# Patient Record
Sex: FEMALE | Race: WHITE | Employment: PART TIME | URBAN - NONMETROPOLITAN AREA
[De-identification: names, ages, dates, MRNs, and addresses within clinical notes are randomized per-mention and may not be internally consistent; named-entity substitution may affect disease eponyms.]

---

## 2018-02-26 ENCOUNTER — HOSPITAL ENCOUNTER (OUTPATIENT)
Age: 43
Discharge: HOME | End: 2018-02-26
Payer: COMMERCIAL

## 2018-02-26 ENCOUNTER — HOSPITAL ENCOUNTER (OUTPATIENT)
Dept: GENERAL RADIOLOGY | Age: 43
Discharge: HOME | End: 2018-02-26
Payer: COMMERCIAL

## 2018-02-26 DIAGNOSIS — R52 PAIN: ICD-10-CM

## 2018-02-26 PROCEDURE — 72100 X-RAY EXAM L-S SPINE 2/3 VWS: CPT

## 2018-07-19 ENCOUNTER — HOSPITAL ENCOUNTER (OUTPATIENT)
Dept: GENERAL RADIOLOGY | Age: 43
Discharge: HOME | End: 2018-07-19
Payer: COMMERCIAL

## 2018-07-19 ENCOUNTER — HOSPITAL ENCOUNTER (OUTPATIENT)
Age: 43
Discharge: HOME | End: 2018-07-19
Payer: COMMERCIAL

## 2018-07-19 DIAGNOSIS — S99.921A INJURY OF FOOT, RIGHT, INITIAL ENCOUNTER: ICD-10-CM

## 2018-07-19 PROCEDURE — 73630 X-RAY EXAM OF FOOT: CPT

## 2020-02-08 ENCOUNTER — HOSPITAL ENCOUNTER (OUTPATIENT)
Age: 45
Discharge: HOME | End: 2020-02-08
Payer: COMMERCIAL

## 2020-02-08 LAB
C-REACTIVE PROTEIN: 0.16 MG/DL (ref 0–1)
CHOLESTEROL, FASTING: 168 MG/DL (ref 100–199)
GLUCOSE FASTING: 98 MG/DL (ref 70–108)
HDLC SERPL-MCNC: 36 MG/DL
LDL CHOLESTEROL CALCULATED: 118 MG/DL
RHEUMATOID FACTOR: < 10 IU/ML (ref 0–13)
SEDIMENTATION RATE, ERYTHROCYTE: 3 MM/HR (ref 0–20)
TRIGLYCERIDE, FASTING: 72 MG/DL (ref 0–199)

## 2020-02-08 PROCEDURE — 36415 COLL VENOUS BLD VENIPUNCTURE: CPT

## 2020-02-08 PROCEDURE — 86140 C-REACTIVE PROTEIN: CPT

## 2020-02-08 PROCEDURE — 80061 LIPID PANEL: CPT

## 2020-02-08 PROCEDURE — 86038 ANTINUCLEAR ANTIBODIES: CPT

## 2020-02-08 PROCEDURE — 85651 RBC SED RATE NONAUTOMATED: CPT

## 2020-02-08 PROCEDURE — 82947 ASSAY GLUCOSE BLOOD QUANT: CPT

## 2020-02-08 PROCEDURE — 86430 RHEUMATOID FACTOR TEST QUAL: CPT

## 2020-02-12 LAB — ANA SCREEN: NORMAL

## 2021-06-03 PROBLEM — R31.29 MICROSCOPIC HEMATURIA: Status: ACTIVE | Noted: 2021-06-03

## 2021-06-03 PROBLEM — R31.29 MICROSCOPIC HEMATURIA: Status: RESOLVED | Noted: 2021-06-03 | Resolved: 2021-06-03

## 2021-06-03 PROBLEM — J45.40 MODERATE PERSISTENT ASTHMA WITHOUT COMPLICATION: Status: ACTIVE | Noted: 2021-06-03

## 2021-06-03 PROBLEM — J45.40 MODERATE PERSISTENT ASTHMA WITHOUT COMPLICATION: Status: RESOLVED | Noted: 2021-06-03 | Resolved: 2021-06-03

## 2022-06-20 PROBLEM — J45.20 MILD INTERMITTENT ASTHMA WITHOUT COMPLICATION: Status: ACTIVE | Noted: 2022-06-20

## 2022-06-20 PROBLEM — F51.01 PRIMARY INSOMNIA: Status: ACTIVE | Noted: 2022-06-20

## 2022-09-07 ENCOUNTER — APPOINTMENT (OUTPATIENT)
Dept: GENERAL RADIOLOGY | Age: 47
End: 2022-09-07
Payer: COMMERCIAL

## 2022-09-07 ENCOUNTER — HOSPITAL ENCOUNTER (EMERGENCY)
Age: 47
Discharge: HOME | End: 2022-09-07
Attending: EMERGENCY MEDICINE
Payer: COMMERCIAL

## 2022-09-07 VITALS
TEMPERATURE: 97.7 F | RESPIRATION RATE: 14 BRPM | DIASTOLIC BLOOD PRESSURE: 76 MMHG | WEIGHT: 175 LBS | SYSTOLIC BLOOD PRESSURE: 141 MMHG | BODY MASS INDEX: 33.04 KG/M2 | HEIGHT: 61 IN | HEART RATE: 91 BPM | OXYGEN SATURATION: 99 %

## 2022-09-07 DIAGNOSIS — M79.672 ACUTE FOOT PAIN, LEFT: Primary | ICD-10-CM

## 2022-09-07 PROCEDURE — 99283 EMERGENCY DEPT VISIT LOW MDM: CPT

## 2022-09-07 PROCEDURE — 73630 X-RAY EXAM OF FOOT: CPT

## 2022-09-07 ASSESSMENT — PAIN - FUNCTIONAL ASSESSMENT
PAIN_FUNCTIONAL_ASSESSMENT: 0-10
PAIN_FUNCTIONAL_ASSESSMENT: 0-10

## 2022-09-07 ASSESSMENT — PAIN DESCRIPTION - ORIENTATION
ORIENTATION: LEFT
ORIENTATION: LEFT

## 2022-09-07 ASSESSMENT — PAIN DESCRIPTION - LOCATION
LOCATION: FOOT
LOCATION: FOOT

## 2022-09-07 ASSESSMENT — PAIN SCALES - GENERAL
PAINLEVEL_OUTOF10: 4
PAINLEVEL_OUTOF10: 8

## 2022-09-07 NOTE — ED NOTES
Pt presents w/ left lateral foot pain. States that she caught her little toe on a shopping cart a couple of months ago. It did swell and bruise at the time. It has not been x-rayed. She is now having continued pain and swelling proximal to the toe.      Benjamin Camejo RN  09/07/22 0517

## 2022-09-07 NOTE — DISCHARGE INSTRUCTIONS
Wear the orthopedic shoe as needed for comfort and protection. Rest, elevate, ice the area as needed. Ibuprofen 600 mg every 6 hours as needed for pain and inflammation.

## 2022-09-07 NOTE — Clinical Note
Leonardo Dc was seen and treated in our emergency department on 9/7/2022. She may return to work on 09/08/2022. If you have any questions or concerns, please don't hesitate to call.       Daryl Limon MD

## 2022-09-07 NOTE — ED PROVIDER NOTES
[address] [facility]  eMERGENCY dEPARTMENT eNCOUnter             Jeaneth Todd 19 COMPLAINT    Chief Complaint   Patient presents with    Foot Pain       Nurses Notes reviewed and I agree except as noted in the HPI. HPI    Nelly Garcia is a 42 y.o. female who presents stating that ago she \"caught her left little toe \"on a shopping cart and had a lot of pain and swelling in the base of the toe for several days, but it gradually got better. However, in the last few days she has had increasing pain at the base of the toe and it seems more swollen. She cannot remember any new injury to it. Current pain level is 4/10, aching, constant. The pain is worse when she wears her shoes at work, feels some better if she wears a flip-flop. Over-the-counter medicine helps a little. REVIEW OF SYSTEMS      Review of Systems   Constitutional: Negative. Musculoskeletal:  Negative for falls. Neurological:  Negative for sensory change. All other systems reviewed and are negative. PAST MEDICAL HISTORY     has a past medical history of Asthma and Microscopic hematuria. SURGICAL HISTORY     has a past surgical history that includes Tonsillectomy; Cholecystectomy, laparoscopic; and Knee arthroscopy w/ meniscectomy (Right, 2013). CURRENT MEDICATIONS    Discharge Medication List as of 9/7/2022 12:58 PM        CONTINUE these medications which have NOT CHANGED    Details   albuterol sulfate HFA (PROVENTIL;VENTOLIN;PROAIR) 108 (90 Base) MCG/ACT inhaler INHALE 2 PUFFS BY MOUTH EVERY 6 HOURS AS NEEDED, Disp-9 g, R-7Normal      fluticasone-salmeterol (ADVAIR HFA) 115-21 MCG/ACT inhaler Inhale 2 puffs by mouth twice daily, Disp-12 g, R-11Normal      traZODone (DESYREL) 150 MG tablet Take 1 tablet by mouth nightly, Disp-90 tablet, R-3Normal             ALLERGIES    is allergic to keflex [cephalexin], levaquin [levofloxacin], penicillins, and tetracyclines & related.     FAMILY HISTORY She indicated that the status of her maternal grandmother is unknown.   family history includes Diabetes in her maternal grandmother. SOCIAL HISTORY     reports that she has quit smoking. Her smoking use included cigarettes. She started smoking about 27 years ago. She smoked an average of .5 packs per day. She has never used smokeless tobacco. She reports that she does not currently use alcohol. She reports that she does not use drugs. PHYSICAL EXAM       INITIAL VITALS: BP (!) 141/76   Pulse 91   Temp 97.7 °F (36.5 °C) (Temporal)   Resp 14   Ht 5' 1\" (1.549 m)   Wt 175 lb (79.4 kg)   SpO2 99%   BMI 33.07 kg/m²      Physical Exam  Vitals and nursing note reviewed. Constitutional:       General: She is not in acute distress. HENT:      Head: Atraumatic. Eyes:      Pupils: Pupils are equal, round, and reactive to light. Cardiovascular:      Pulses: Normal pulses. Musculoskeletal:         General: Swelling and tenderness (Foot, head of the fifth metatarsal at the metatarsophalangeal joint, swollen, tender, no obvious deformity.) present. Skin:     General: Skin is warm and dry. Capillary Refill: Capillary refill takes less than 2 seconds. Neurological:      General: No focal deficit present. Mental Status: She is alert and oriented to person, place, and time. Psychiatric:         Behavior: Behavior normal.            RADIOLOGY:    XR FOOT LEFT (MIN 3 VIEWS)   Final Result   1. No acute bony abnormality. **This report has been created using voice recognition software. It may contain minor errors which are inherent in voice recognition technology. **      Final report electronically signed by Dr Selam Perrin on 9/7/2022 12:22 PM               Vitals:    Vitals:    09/07/22 1143   BP: (!) 141/76   Pulse: 91   Resp: 14   Temp: 97.7 °F (36.5 °C)   TempSrc: Temporal   SpO2: 99%   Weight: 175 lb (79.4 kg)   Height: 5' 1\" (1.549 m)       EMERGENCY DEPARTMENT COURSE:      X-ray results and plan of care discussed with the patient. She was placed in a postop shoe. General measures discussed. FINAL IMPRESSION      1.  Acute foot pain, left        DISPOSITION/PLAN    DISPOSITION Decision To Discharge 09/07/2022 12:33:54 PM      PATIENT REFERRED TO:    [address] [place] [address]  [phone]  Call   As needed for persistent pain    DISCHARGE MEDICATIONS:    Discharge Medication List as of 9/7/2022 12:58 PM             (Please note that portions of this note were completed with a voice recognition program.  Efforts were made to edit the dictations but occasionally words are mis-transcribed.)       Emanuel Curiel MD  09/07/22 2039

## 2023-10-04 ENCOUNTER — APPOINTMENT (OUTPATIENT)
Dept: GENERAL RADIOLOGY | Age: 48
End: 2023-10-04
Payer: OTHER MISCELLANEOUS

## 2023-10-04 ENCOUNTER — HOSPITAL ENCOUNTER (EMERGENCY)
Age: 48
Discharge: HOME | End: 2023-10-04
Attending: EMERGENCY MEDICINE
Payer: OTHER MISCELLANEOUS

## 2023-10-04 VITALS
RESPIRATION RATE: 16 BRPM | WEIGHT: 190 LBS | SYSTOLIC BLOOD PRESSURE: 142 MMHG | HEIGHT: 62 IN | OXYGEN SATURATION: 96 % | TEMPERATURE: 98.3 F | BODY MASS INDEX: 34.96 KG/M2 | HEART RATE: 98 BPM | DIASTOLIC BLOOD PRESSURE: 77 MMHG

## 2023-10-04 DIAGNOSIS — S63.501A SPRAIN OF RIGHT FOREARM, INITIAL ENCOUNTER: Primary | ICD-10-CM

## 2023-10-04 PROCEDURE — 99283 EMERGENCY DEPT VISIT LOW MDM: CPT

## 2023-10-04 PROCEDURE — 73090 X-RAY EXAM OF FOREARM: CPT

## 2023-10-04 ASSESSMENT — PAIN DESCRIPTION - DESCRIPTORS: DESCRIPTORS: ACHING

## 2023-10-04 ASSESSMENT — PAIN SCALES - GENERAL
PAINLEVEL_OUTOF10: 3
PAINLEVEL_OUTOF10: 3

## 2023-10-04 ASSESSMENT — PAIN DESCRIPTION - ORIENTATION
ORIENTATION: RIGHT
ORIENTATION: RIGHT

## 2023-10-04 ASSESSMENT — PAIN - FUNCTIONAL ASSESSMENT
PAIN_FUNCTIONAL_ASSESSMENT: 0-10
PAIN_FUNCTIONAL_ASSESSMENT: 0-10

## 2023-10-04 ASSESSMENT — PAIN DESCRIPTION - LOCATION
LOCATION: ARM
LOCATION: ARM

## 2023-10-04 NOTE — DISCHARGE INSTRUCTIONS
Use Tylenol or Motrin for aches or pains. Keep antibiotic ointment on the areas to prevent infection.   Follow-up with primary care
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
## 2023-10-04 NOTE — ED NOTES
Pt pink, warm and dry, breathing with ease. ACE wrap intact, strong right radial pulse, pt walking without difficulty. AVS reviewed. Denies questions or concerns. Pt remains alert and oriented. Pt discharged in stable condition.        Lemuel Roblero RN  10/04/23 1400

## 2023-10-04 NOTE — ED NOTES
RFA superficial wound cleansed with wound wash, covered with bandaid.       Moustapha Banegas RN  10/04/23 4794

## 2024-07-11 ENCOUNTER — HOSPITAL ENCOUNTER (OUTPATIENT)
Dept: MAMMOGRAPHY | Age: 49
Discharge: HOME | End: 2024-07-11
Payer: COMMERCIAL

## 2024-07-11 VITALS — BODY MASS INDEX: 35.87 KG/M2 | HEIGHT: 61 IN | WEIGHT: 190 LBS

## 2024-07-11 DIAGNOSIS — Z12.39 BREAST SCREENING: ICD-10-CM

## 2024-07-11 PROCEDURE — 77063 BREAST TOMOSYNTHESIS BI: CPT
